# Patient Record
Sex: FEMALE | Race: WHITE
[De-identification: names, ages, dates, MRNs, and addresses within clinical notes are randomized per-mention and may not be internally consistent; named-entity substitution may affect disease eponyms.]

---

## 2019-06-23 ENCOUNTER — HOSPITAL ENCOUNTER (EMERGENCY)
Dept: HOSPITAL 25 - UCCORT | Age: 64
Discharge: HOME | End: 2019-06-23
Payer: MEDICARE

## 2019-06-23 VITALS — SYSTOLIC BLOOD PRESSURE: 111 MMHG | DIASTOLIC BLOOD PRESSURE: 38 MMHG

## 2019-06-23 DIAGNOSIS — R21: Primary | ICD-10-CM

## 2019-06-23 PROCEDURE — 99212 OFFICE O/P EST SF 10 MIN: CPT

## 2019-06-23 PROCEDURE — G0463 HOSPITAL OUTPT CLINIC VISIT: HCPCS

## 2019-06-23 NOTE — UC
Skin Complaint HPI





- HPI Summary


HPI Summary: 





about a week hx of facial rash


some mild pain


swelling and tenderness below left eye





now with tender left anterior cervical LN





using new facial product











- History of Current Complaint


Chief Complaint: UCSkin


Time Seen by Provider: 06/23/19 10:32


Stated Complaint: FACIAL SWELLING/SKIN CONCERN,SORE THROAT


Hx Obtained From: Patient


Onset/Duration: Gradual Onset, Lasting Days


Timing: Constant


Onset Severity: Mild


Current Severity: Moderate


Pain Intensity: 1


Location: Face


Character: Pain, Redness, Raised


Aggravating Factor(s): Nothing


Alleviating Factor(s): Nothing


Associated Signs & Symptoms: Positive: Rash, Tenderness.  Negative: Nausea, 

Vomiting, Numbness, Thirst, Diaphoresis, Weakness, Pallor, Shivering, 

Difficulty Breathing, Fever, Chills, Cough, Wheezing, Chest Pain, Hoarseness, 

Throat Tightening, Abdominal Pain, Lightheadedness, Syncope, Drainage, Bruising

, Red Streaks, Joint Swelling





- Allergy/Home Medications


Allergies/Adverse Reactions: 


 Allergies











Allergy/AdvReac Type Severity Reaction Status Date / Time


 


No Known Allergies Allergy   Verified 06/23/19 09:21











Home Medications: 


 Home Medications





LoraTADine TAB(NF) [Claritin 10 MG TAB(NF)] 10 mg PO DAILY PRN 06/23/19 [

History Confirmed 06/23/19]


Simvastatin TAB(NF) [Zocor(NF)] 10 mg PO DAILY 06/23/19 [History Confirmed 06/23 /19]











PMH/Surg Hx/FS Hx/Imm Hx


Previously Healthy: Yes





- Surgical History


Surgical History: None





- Family History


Known Family History: Positive: Hypertension





- Social History


Alcohol Use: Occasionally


Substance Use Type: None


Smoking Status (MU): Never Smoked Tobacco





Review of Systems


All Other Systems Reviewed And Are Negative: Yes


Constitutional: Positive: Negative


Skin: Positive: Rash


Eyes: Positive: Negative


ENT: Positive: Negative


Respiratory: Positive: Negative


Cardiovascular: Positive: Negative


Gastrointestinal: Positive: Negative


Genitourinary: Positive: Negative


Motor: Positive: Negative


Neurovascular: Positive: Negative


Musculoskeletal: Positive: Negative


Neurological: Positive: Negative


Psychological: Positive: Negative





Physical Exam


Triage Information Reviewed: Yes


Appearance: Well-Appearing, No Pain Distress, Well-Nourished


Vital Signs: 


 Initial Vital Signs











Temp  97.4 F   06/23/19 09:19


 


Pulse  70   06/23/19 09:19


 


Resp  16   06/23/19 09:19


 


BP  111/38   06/23/19 09:19


 


Pulse Ox  100   06/23/19 09:19











Vital Signs Reviewed: Yes


Eyes: Positive: Conjunctiva Clear.  Negative: Conjunctiva Inflamed, Discharge


ENT: Positive: Hearing grossly normal, Uvula midline.  Negative: Nasal 

congestion, Nasal drainage, Tonsillar swelling, Tonsillar exudate, Hoarse voice


Dental Exam: Normal


Neck: Positive: Supple, Nontender, Enlarged Nodes @ - left anterior lymph node


Respiratory: Positive: Lungs clear, Normal breath sounds, No respiratory 

distress, No accessory muscle use


Cardiovascular: Positive: RRR, No Murmur


Musculoskeletal: Positive: ROM Intact, No Edema


Neurological: Positive: Alert


Skin Exam: Other - see image





Images


Head: 


  __________________________














  __________________________





 1 - red raised





 2 - red/raised , tender NO VESICLES





 3 - swollen/tender, LN





 4 - no eye redness or irritation/tearing








Course/Dx





- Diagnoses


Provider Diagnosis: 


 Rash of face








Discharge





- Sign-Out/Discharge


Documenting (check all that apply): Patient Departure


All imaging exams completed and their final reports reviewed: No Studies





- Discharge Plan


Condition: Stable


Disposition: HOME


Prescriptions: 


Cephalexin CAP* [Keflex CAP*] 500 mg PO TID #21 cap


Patient Education Materials:  Acute Rash (ED)


Referrals: 


oLi Klein DO [Primary Care Provider] - 5 Days


Additional Instructions: 


I am unsure of the cause of your rash





we will start antibiotics in case this is due to infection





Recheck if you develop blisters





Recheck for fever





apply no facial products to area





you can use aquaphor healing ointment





see your MD late this week for recheck





- Billing Disposition and Condition


Condition: STABLE


Disposition: Home